# Patient Record
Sex: MALE | Race: WHITE | Employment: FULL TIME | URBAN - METROPOLITAN AREA
[De-identification: names, ages, dates, MRNs, and addresses within clinical notes are randomized per-mention and may not be internally consistent; named-entity substitution may affect disease eponyms.]

---

## 2017-06-22 ENCOUNTER — OFFICE VISIT (OUTPATIENT)
Dept: FAMILY MEDICINE CLINIC | Facility: CLINIC | Age: 27
End: 2017-06-22

## 2017-06-22 VITALS
RESPIRATION RATE: 18 BRPM | WEIGHT: 165 LBS | SYSTOLIC BLOOD PRESSURE: 140 MMHG | TEMPERATURE: 98 F | BODY MASS INDEX: 25.59 KG/M2 | HEIGHT: 67.5 IN | DIASTOLIC BLOOD PRESSURE: 72 MMHG | HEART RATE: 73 BPM | OXYGEN SATURATION: 98 %

## 2017-06-22 DIAGNOSIS — S76.211A GROIN STRAIN, RIGHT, INITIAL ENCOUNTER: ICD-10-CM

## 2017-06-22 DIAGNOSIS — Z13.6 SCREENING FOR HYPERTENSION: Primary | ICD-10-CM

## 2017-06-22 DIAGNOSIS — Z11.59 NEED FOR HEPATITIS C SCREENING TEST: ICD-10-CM

## 2017-06-22 DIAGNOSIS — F10.10 ALCOHOL ABUSE: ICD-10-CM

## 2017-06-22 DIAGNOSIS — R52 PAIN: ICD-10-CM

## 2017-06-22 DIAGNOSIS — Z71.6 TOBACCO ABUSE COUNSELING: ICD-10-CM

## 2017-06-22 PROCEDURE — 99213 OFFICE O/P EST LOW 20 MIN: CPT | Performed by: FAMILY MEDICINE

## 2017-06-22 NOTE — PROGRESS NOTES
An extremely anxious gentleman who is concerned about a pain in the right mons pubis adjacent to an area of hernia repair as a toddler. At that time the repair was uneventful. He has decreased his smoking but is still drinking 6-8 beers per day.   He ad

## 2017-08-02 PROCEDURE — 87081 CULTURE SCREEN ONLY: CPT | Performed by: EMERGENCY MEDICINE

## 2017-08-02 PROCEDURE — 87147 CULTURE TYPE IMMUNOLOGIC: CPT | Performed by: EMERGENCY MEDICINE

## 2018-01-29 ENCOUNTER — TELEPHONE (OUTPATIENT)
Dept: FAMILY MEDICINE CLINIC | Facility: CLINIC | Age: 28
End: 2018-01-29

## 2018-01-29 NOTE — TELEPHONE ENCOUNTER
Patient having chest tightness on and off x 2 weeks, also some sob. Patient sent to  for eval. Agreed to go.

## 2018-07-28 ENCOUNTER — HOSPITAL ENCOUNTER (OUTPATIENT)
Age: 28
Discharge: HOME | End: 2018-07-28
Payer: COMMERCIAL

## 2018-07-28 VITALS
DIASTOLIC BLOOD PRESSURE: 89 MMHG | BODY MASS INDEX: 25.17 KG/M2 | TEMPERATURE: 99 F | WEIGHT: 168 LBS | HEIGHT: 68.5 IN | RESPIRATION RATE: 16 BRPM | SYSTOLIC BLOOD PRESSURE: 150 MMHG | OXYGEN SATURATION: 98 % | HEART RATE: 71 BPM

## 2018-07-28 DIAGNOSIS — R10.13 EPIGASTRIC BURNING SENSATION: Primary | ICD-10-CM

## 2018-07-28 LAB
#LYMPHOCYTE IC: 2.2 X10ˆ3/UL (ref 0.9–3.2)
#MXD IC: 0.6 X10ˆ3/UL (ref 0.1–1)
#NEUTROPHIL IC: 6.1 X10ˆ3/UL (ref 1.3–6.7)
CREAT SERPL-MCNC: 1 MG/DL (ref 0.7–1.3)
GLUCOSE BLD-MCNC: 104 MG/DL (ref 70–99)
HCT IC: 46.8 % (ref 37–54)
HGB IC: 15.9 G/DL (ref 13–17)
ISTAT BLOOD GAS TCO2: 28 MMOL/L (ref 22–32)
ISTAT BUN: 18 MG/DL (ref 8–20)
ISTAT CHLORIDE: 101 MMOL/L (ref 101–111)
ISTAT HEMATOCRIT: 49 % (ref 37–53)
ISTAT IONIZED CALCIUM: 1.06 MMOL/L
ISTAT POTASSIUM: 3.6 MMOL/L (ref 3.6–5.1)
ISTAT SODIUM: 142 MMOL/L (ref 136–144)
ISTAT TROPONIN: <0.1 NG/ML (ref ?–0.1)
LYMPHOCYTES NFR BLD AUTO: 24.2 %
MCH IC: 30.9 PG (ref 27–33.2)
MCHC IC: 34 G/DL (ref 31–37)
MCV IC: 91.1 FL (ref 80–99)
MIXED CELL %: 7.2 %
NEUTROPHILS NFR BLD AUTO: 68.6 %
PLT IC: 256 X10ˆ3/UL (ref 150–450)
RBC IC: 5.14 X10ˆ6/UL (ref 4.3–5.7)
WBC IC: 8.9 X10ˆ3/UL (ref 4–13)

## 2018-07-28 PROCEDURE — 36415 COLL VENOUS BLD VENIPUNCTURE: CPT

## 2018-07-28 PROCEDURE — 99213 OFFICE O/P EST LOW 20 MIN: CPT

## 2018-07-28 PROCEDURE — 84484 ASSAY OF TROPONIN QUANT: CPT

## 2018-07-28 PROCEDURE — 80047 BASIC METABLC PNL IONIZED CA: CPT

## 2018-07-28 PROCEDURE — 80053 COMPREHEN METABOLIC PANEL: CPT | Performed by: NURSE PRACTITIONER

## 2018-07-28 PROCEDURE — 83690 ASSAY OF LIPASE: CPT | Performed by: NURSE PRACTITIONER

## 2018-07-28 PROCEDURE — 85025 COMPLETE CBC W/AUTO DIFF WBC: CPT | Performed by: NURSE PRACTITIONER

## 2018-07-28 RX ORDER — OMEPRAZOLE 40 MG/1
40 CAPSULE, DELAYED RELEASE ORAL DAILY
Qty: 14 CAPSULE | Refills: 0 | Status: SHIPPED | OUTPATIENT
Start: 2018-07-28 | End: 2018-08-11

## 2018-07-28 RX ORDER — ONDANSETRON 4 MG/1
4 TABLET, ORALLY DISINTEGRATING ORAL EVERY 4 HOURS PRN
Qty: 10 TABLET | Refills: 0 | Status: SHIPPED | OUTPATIENT
Start: 2018-07-28 | End: 2018-08-04

## 2018-07-28 NOTE — ED INITIAL ASSESSMENT (HPI)
Pt sts 5 days ago began with intermittent nausea, lower abd pain-burning- worse after eating, intermittent diarrhea, \"extreme fatigue. \"  Cough for past 3 days.

## 2018-07-28 NOTE — ED PROVIDER NOTES
Patient Seen in: [address] [facility]    History   Patient presents with:  Nausea/Vomiting/Diarrhea (gastrointestinal)  Abdomen/Flank Pain (GI/)    Stated Complaint: nauseated X 5 days; stomach burning; extreme fatigue    HPI  Patient is a 2 EXCISION OF SCROTAL CYST;  Surgeon:               Mahsa Mix MD;  Location: [address] [place] [address],[address]  1/24/2013: [address] <5MM REMAINDR BODY      Comment: Procedure: EXCISION OF SCROTAL CYST;  Surgeon:               Chema Nelson CYST;  Surgeon:               Zuleika Jimenez MD;  Location: [address] [place] [address],[address]  1/24/2013: [address] <5MM REMAINDR BODY      Comment: Procedure: EXCISION OF SCROTAL CYST;  Surgeon:               Zuleika Jimenez MD;  Location:  Azael Farah MD;  Location: [address] [place] [address],[address]  1/24/2013: [address] <5MM REMAINDR BODY      Comment: Procedure: EXCISION OF SCROTAL CYST;  Surgeon:               Azael Farah MD;  Location: [facility] Mouth/Throat: Uvula is midline, oropharynx is clear and moist and mucous membranes are normal.   Eyes: Conjunctivae are normal.   Cardiovascular: Normal rate, regular rhythm, normal heart sounds and intact distal pulses.   Exam reveals no gallop and no fr the patient that he will need to go to the emergency department if his symptoms persist or worsen. Will discharge home with omeprazole 40 mg daily and Zofran as needed. Advised patient to avoid alcohol and tobacco and to eat a bland diet .   patient has a

## 2018-07-28 NOTE — ED PROVIDER NOTES
I reviewed that chart and discussed the case.  epigastric pain, nausea. No CP or SOB. Pt refuses imaging and EKG. Agreed to lab work. Unremarkable. Trop negative. CMP and lipase pending. Pt requesting zofran. Understands risk of missed MI.   S/s mo

## 2018-07-29 LAB
ALBUMIN SERPL-MCNC: 5.1 G/DL (ref 3.5–4.8)
ALBUMIN/GLOB SERPL: 1.3 {RATIO} (ref 1–2)
ALP LIVER SERPL-CCNC: 68 U/L (ref 45–117)
ALT SERPL-CCNC: 62 U/L (ref 17–63)
ANION GAP SERPL CALC-SCNC: 7 MMOL/L (ref 0–18)
AST SERPL-CCNC: 42 U/L (ref 15–41)
BILIRUB SERPL-MCNC: 0.4 MG/DL (ref 0.1–2)
BUN BLD-MCNC: 17 MG/DL (ref 8–20)
BUN/CREAT SERPL: 15.5 (ref 10–20)
CALCIUM BLD-MCNC: 9.5 MG/DL (ref 8.3–10.3)
CHLORIDE SERPL-SCNC: 105 MMOL/L (ref 101–111)
CO2 SERPL-SCNC: 27 MMOL/L (ref 22–32)
CREAT BLD-MCNC: 1.1 MG/DL (ref 0.7–1.3)
GLOBULIN PLAS-MCNC: 4 G/DL (ref 2.5–3.7)
GLUCOSE BLD-MCNC: 92 MG/DL (ref 70–99)
LIPASE: 146 U/L (ref 73–393)
M PROTEIN MFR SERPL ELPH: 9.1 G/DL (ref 6.1–8.3)
OSMOLALITY SERPL CALC.SUM OF ELEC: 289 MOSM/KG (ref 275–295)
POTASSIUM SERPL-SCNC: 5.3 MMOL/L (ref 3.6–5.1)
SODIUM SERPL-SCNC: 139 MMOL/L (ref 136–144)

## 2018-07-30 NOTE — ED NOTES
Patient calling for lab results. Patient informed that K was elevated. Patient states he has an appt with is pcp on Thursday for follow up. Pt advised to call them today if he won't go to ER to see if pcp would advise to have lab re-drawn prior to appt.  Pa

## 2018-08-02 ENCOUNTER — OFFICE VISIT (OUTPATIENT)
Dept: FAMILY MEDICINE CLINIC | Facility: CLINIC | Age: 28
End: 2018-08-02
Payer: COMMERCIAL

## 2018-08-02 VITALS
WEIGHT: 170 LBS | BODY MASS INDEX: 26.37 KG/M2 | HEIGHT: 67.5 IN | TEMPERATURE: 99 F | DIASTOLIC BLOOD PRESSURE: 84 MMHG | RESPIRATION RATE: 16 BRPM | SYSTOLIC BLOOD PRESSURE: 136 MMHG | OXYGEN SATURATION: 98 % | HEART RATE: 66 BPM

## 2018-08-02 DIAGNOSIS — R10.13 EPIGASTRIC PAIN: ICD-10-CM

## 2018-08-02 DIAGNOSIS — Z13.1 SCREENING FOR DIABETES MELLITUS: ICD-10-CM

## 2018-08-02 DIAGNOSIS — Z13.220 ENCOUNTER FOR LIPID SCREENING FOR CARDIOVASCULAR DISEASE: ICD-10-CM

## 2018-08-02 DIAGNOSIS — Z13.29 SCREENING FOR THYROID DISORDER: ICD-10-CM

## 2018-08-02 DIAGNOSIS — Z13.0 SCREENING, ANEMIA, DEFICIENCY, IRON: ICD-10-CM

## 2018-08-02 DIAGNOSIS — Z13.6 ENCOUNTER FOR LIPID SCREENING FOR CARDIOVASCULAR DISEASE: ICD-10-CM

## 2018-08-02 DIAGNOSIS — R63.4 UNEXPLAINED WEIGHT LOSS: Primary | ICD-10-CM

## 2018-08-02 PROCEDURE — 99214 OFFICE O/P EST MOD 30 MIN: CPT | Performed by: FAMILY MEDICINE

## 2018-08-02 RX ORDER — DICYCLOMINE HYDROCHLORIDE 10 MG/1
10 CAPSULE ORAL 4 TIMES DAILY
Qty: 40 CAPSULE | Refills: 3 | Status: SHIPPED | OUTPATIENT
Start: 2018-08-02 | End: 2018-08-12

## 2018-08-02 RX ORDER — DICYCLOMINE HYDROCHLORIDE 10 MG/1
10 CAPSULE ORAL 4 TIMES DAILY
Qty: 40 CAPSULE | Refills: 3 | Status: SHIPPED | OUTPATIENT
Start: 2018-08-02 | End: 2018-08-02

## 2018-08-02 NOTE — PROGRESS NOTES
No usually anxious young man here with numerous complaints has been having postprandial midepigastric discomfort for the past 1-2 months.   Occasionally has upper symmetric chest discomfort and has been evaluated in urgent care with negative cardiac enzymes

## 2019-07-06 ENCOUNTER — HOSPITAL ENCOUNTER (OUTPATIENT)
Age: 29
Discharge: HOME | End: 2019-07-06
Attending: FAMILY MEDICINE
Payer: COMMERCIAL

## 2019-07-06 ENCOUNTER — APPOINTMENT (OUTPATIENT)
Dept: GENERAL RADIOLOGY | Age: 29
End: 2019-07-06
Attending: FAMILY MEDICINE
Payer: COMMERCIAL

## 2019-07-06 VITALS
TEMPERATURE: 99 F | RESPIRATION RATE: 16 BRPM | OXYGEN SATURATION: 97 % | SYSTOLIC BLOOD PRESSURE: 145 MMHG | DIASTOLIC BLOOD PRESSURE: 82 MMHG | HEART RATE: 76 BPM

## 2019-07-06 DIAGNOSIS — J20.9 ACUTE BRONCHITIS, UNSPECIFIED ORGANISM: Primary | ICD-10-CM

## 2019-07-06 PROCEDURE — 71046 X-RAY EXAM CHEST 2 VIEWS: CPT | Performed by: FAMILY MEDICINE

## 2019-07-06 PROCEDURE — 99214 OFFICE O/P EST MOD 30 MIN: CPT

## 2019-07-06 PROCEDURE — 99213 OFFICE O/P EST LOW 20 MIN: CPT

## 2019-07-06 RX ORDER — BENZONATATE 200 MG/1
200 CAPSULE ORAL 3 TIMES DAILY PRN
Qty: 15 CAPSULE | Refills: 0 | Status: SHIPPED | OUTPATIENT
Start: 2019-07-06 | End: 2020-05-01 | Stop reason: ALTCHOICE

## 2019-07-06 RX ORDER — AMOXICILLIN AND CLAVULANATE POTASSIUM 875; 125 MG/1; MG/1
1 TABLET, FILM COATED ORAL 2 TIMES DAILY
Qty: 14 TABLET | Refills: 0 | Status: SHIPPED | OUTPATIENT
Start: 2019-07-06 | End: 2019-07-13

## 2019-07-06 RX ORDER — ALBUTEROL SULFATE 90 UG/1
2 AEROSOL, METERED RESPIRATORY (INHALATION) EVERY 4 HOURS PRN
Qty: 1 INHALER | Refills: 6 | Status: SHIPPED | OUTPATIENT
Start: 2019-07-06 | End: 2019-07-16

## 2019-07-06 RX ORDER — PREDNISONE 20 MG/1
40 TABLET ORAL DAILY
Qty: 10 TABLET | Refills: 0 | Status: SHIPPED | OUTPATIENT
Start: 2019-07-06 | End: 2019-07-11

## 2019-07-06 NOTE — ED INITIAL ASSESSMENT (HPI)
Pt sts cough for 2-3 days. Today noted blood in sputum. Feeling tight and congested in chest. No known fever.

## 2019-07-06 NOTE — ED PROVIDER NOTES
Patient Seen in: [address] [facility]    History   Patient presents with:  Cough/URI    Stated Complaint: cough x 3 days    HPI    25-year-old male presents to the immediate care today with chief complaints of  chest congestion, productive cou Normocephalic, without obvious abnormality, atraumatic  Eyes: conjunctivae/corneas clear. PERRL, EOM's intact. Fundi benign. Ears: normal TM's and external ear canals both ears  Nose: Nares normal. Septum midline.  Mucosa normal. No drainage or sinus tende [id number]  [phone]    In 1 week  For re-check        Medications Prescribed:  Current Discharge Medication List    START taking these medications    Amoxicillin-Pot Clavulanate 875-125 MG Oral Tab  Take 1 tablet by mouth 2 (two) times daily for 7 days.   Q

## 2019-08-14 ENCOUNTER — HOSPITAL ENCOUNTER (OUTPATIENT)
Age: 29
Discharge: HOME | End: 2019-08-14
Attending: FAMILY MEDICINE
Payer: COMMERCIAL

## 2019-08-14 VITALS
HEART RATE: 84 BPM | DIASTOLIC BLOOD PRESSURE: 87 MMHG | SYSTOLIC BLOOD PRESSURE: 151 MMHG | RESPIRATION RATE: 16 BRPM | TEMPERATURE: 98 F | BODY MASS INDEX: 26.52 KG/M2 | OXYGEN SATURATION: 97 % | WEIGHT: 175 LBS | HEIGHT: 68 IN

## 2019-08-14 DIAGNOSIS — R51.9 ACUTE NONINTRACTABLE HEADACHE, UNSPECIFIED HEADACHE TYPE: Primary | ICD-10-CM

## 2019-08-14 PROCEDURE — 99212 OFFICE O/P EST SF 10 MIN: CPT

## 2019-08-14 RX ORDER — KETOROLAC TROMETHAMINE 30 MG/ML
60 INJECTION, SOLUTION INTRAMUSCULAR; INTRAVENOUS ONCE
Status: DISCONTINUED | OUTPATIENT
Start: 2019-08-14 | End: 2019-08-14

## 2019-08-14 NOTE — ED PROVIDER NOTES
Patient Seen in: [address] [facility]    History   Patient presents with:  Headache (neurologic)  Back Pain (musculoskeletal)  Numbness Weakness (neurologic)    Stated Complaint: headache, feels \"spacey\"    HPI    **10-year-old male presents for stated complaint: headache, feels \"spacey\"  Other systems are as noted in HPI. Constitutional and vital signs reviewed. All other systems reviewed and negative except as noted above.     Physical Exam     ED Triage Vitals [08/14/19 1738]   BP 15 sensation in both legs and numbness in his left leg, back pain, abdominal pain. Multiple etiologies were discussed in detail. No specific etiology could be identified.   With regards to his neuro symptoms I decided to do a CT brain to rule out any acute i

## 2019-08-14 NOTE — ED INITIAL ASSESSMENT (HPI)
Headache started this morning. Back pain 4-5 days ago but is feeling better, no injury. Today at 1pm while at work, had heaviness in both legs and numbness to left leg. Patient states felt like legs were heavy \"like he was walking with cement shoes\".  Hea

## 2019-10-08 ENCOUNTER — OFFICE VISIT (OUTPATIENT)
Dept: FAMILY MEDICINE CLINIC | Facility: CLINIC | Age: 29
End: 2019-10-08
Payer: COMMERCIAL

## 2019-10-08 VITALS
HEIGHT: 68 IN | BODY MASS INDEX: 27.13 KG/M2 | RESPIRATION RATE: 16 BRPM | DIASTOLIC BLOOD PRESSURE: 82 MMHG | SYSTOLIC BLOOD PRESSURE: 136 MMHG | TEMPERATURE: 98 F | HEART RATE: 72 BPM | OXYGEN SATURATION: 98 % | WEIGHT: 179 LBS

## 2019-10-08 DIAGNOSIS — N50.9 LESION OF SCROTUM: Primary | ICD-10-CM

## 2019-10-08 PROBLEM — L72.3 SEBACEOUS CYST OF SCROTUM: Status: ACTIVE | Noted: 2019-10-08

## 2019-10-08 PROCEDURE — 99212 OFFICE O/P EST SF 10 MIN: CPT | Performed by: FAMILY MEDICINE

## 2019-10-08 NOTE — PROGRESS NOTES
Patient is here for examination of the lesion on his scrotal sac right side upper pole he has had these removed under anesthesia apparently in years past but not at this office.   On today's exam a 1/2 cm lesion raised yellow-white in appearance nondraining

## 2019-11-15 ENCOUNTER — OFFICE VISIT (OUTPATIENT)
Dept: FAMILY MEDICINE CLINIC | Facility: CLINIC | Age: 29
End: 2019-11-15
Payer: COMMERCIAL

## 2019-11-15 VITALS
RESPIRATION RATE: 16 BRPM | SYSTOLIC BLOOD PRESSURE: 146 MMHG | HEART RATE: 88 BPM | OXYGEN SATURATION: 98 % | DIASTOLIC BLOOD PRESSURE: 98 MMHG | TEMPERATURE: 99 F

## 2019-11-15 DIAGNOSIS — L72.3 SEBACEOUS CYST OF SCROTUM: Primary | ICD-10-CM

## 2019-11-15 PROCEDURE — 10060 I&D ABSCESS SIMPLE/SINGLE: CPT | Performed by: FAMILY MEDICINE

## 2019-11-15 NOTE — PROGRESS NOTES
Here for elective removal of a 5 mm yellow-white elevated nonvascular lesion just in the right hemiscrotum we attempted removal in the past but he felt uncomfortable at that time. He presents today more willing for removal exam as noted.   Diagnosis is gra

## 2020-10-19 ENCOUNTER — OFFICE VISIT (OUTPATIENT)
Dept: FAMILY MEDICINE CLINIC | Facility: CLINIC | Age: 30
End: 2020-10-19
Payer: COMMERCIAL

## 2020-10-19 VITALS
WEIGHT: 184 LBS | DIASTOLIC BLOOD PRESSURE: 84 MMHG | RESPIRATION RATE: 20 BRPM | HEART RATE: 97 BPM | BODY MASS INDEX: 27.89 KG/M2 | TEMPERATURE: 99 F | SYSTOLIC BLOOD PRESSURE: 124 MMHG | OXYGEN SATURATION: 99 % | HEIGHT: 68 IN

## 2020-10-19 DIAGNOSIS — R10.9 STOMACH PAIN: Primary | ICD-10-CM

## 2020-10-19 PROCEDURE — 87086 URINE CULTURE/COLONY COUNT: CPT | Performed by: INTERNAL MEDICINE

## 2020-10-19 PROCEDURE — 3079F DIAST BP 80-89 MM HG: CPT | Performed by: INTERNAL MEDICINE

## 2020-10-19 PROCEDURE — 87591 N.GONORRHOEAE DNA AMP PROB: CPT | Performed by: INTERNAL MEDICINE

## 2020-10-19 PROCEDURE — 3008F BODY MASS INDEX DOCD: CPT | Performed by: INTERNAL MEDICINE

## 2020-10-19 PROCEDURE — 3074F SYST BP LT 130 MM HG: CPT | Performed by: INTERNAL MEDICINE

## 2020-10-19 PROCEDURE — 81003 URINALYSIS AUTO W/O SCOPE: CPT | Performed by: INTERNAL MEDICINE

## 2020-10-19 PROCEDURE — 99214 OFFICE O/P EST MOD 30 MIN: CPT | Performed by: INTERNAL MEDICINE

## 2020-10-19 PROCEDURE — 87491 CHLMYD TRACH DNA AMP PROBE: CPT | Performed by: INTERNAL MEDICINE

## 2020-10-19 RX ORDER — SULFAMETHOXAZOLE AND TRIMETHOPRIM 800; 160 MG/1; MG/1
1 TABLET ORAL 2 TIMES DAILY
Qty: 20 TABLET | Refills: 0 | Status: SHIPPED | OUTPATIENT
Start: 2020-10-19 | End: 2020-10-29

## 2020-10-19 NOTE — PROGRESS NOTES
Jennifer Durbin is a 30year old male. HPI:   Pt here with report of lower abdominal pain and scrotal pain for 5 days.  Hurts more if he presses his lower abdomen, may have some nausea too.  + lower dull back pain started today  + headaches  No new partne masses or tenderness on exam, no signs of epididymitis on exam, no teste tenderness on exam, not retracted on exam; + cremaster reflex on exam bilaterally  EXT: no cyanosis, clubbing or edema    ASSESSMENT AND PLAN:   Stomach pain  (primary encounter diagn

## 2020-10-22 ENCOUNTER — TELEPHONE (OUTPATIENT)
Dept: FAMILY MEDICINE CLINIC | Facility: CLINIC | Age: 30
End: 2020-10-22

## 2020-10-22 DIAGNOSIS — N50.82 SCROTUM PAIN: Primary | ICD-10-CM

## 2020-10-22 DIAGNOSIS — R10.2 PELVIC PAIN: ICD-10-CM

## 2020-10-22 NOTE — TELEPHONE ENCOUNTER
Patient had seen Mg Kern on 10/19. States that it was discussed if he was still having pain that she would be able to place an order for an ultrasound. Patient had wanted to get it done tomorrow. He does have an appt w/ Dr. Cherylene League tomorrow.     Please advi

## 2020-10-22 NOTE — TELEPHONE ENCOUNTER
Deloris Link to patient. Did not start medication 1st day,  Has taken 3 doses. C/o discomfort. Did advise patient abx may take 48hrs to kick in. Please advise.

## 2020-10-24 ENCOUNTER — HOSPITAL ENCOUNTER (EMERGENCY)
Facility: HOSPITAL | Age: 30
Discharge: HOME | End: 2020-10-24
Attending: EMERGENCY MEDICINE
Payer: COMMERCIAL

## 2020-10-24 ENCOUNTER — APPOINTMENT (OUTPATIENT)
Dept: ULTRASOUND IMAGING | Facility: HOSPITAL | Age: 30
End: 2020-10-24
Attending: EMERGENCY MEDICINE
Payer: COMMERCIAL

## 2020-10-24 VITALS
OXYGEN SATURATION: 98 % | TEMPERATURE: 98 F | SYSTOLIC BLOOD PRESSURE: 157 MMHG | BODY MASS INDEX: 27.43 KG/M2 | WEIGHT: 181 LBS | RESPIRATION RATE: 17 BRPM | HEART RATE: 81 BPM | HEIGHT: 68 IN | DIASTOLIC BLOOD PRESSURE: 107 MMHG

## 2020-10-24 DIAGNOSIS — N50.811 PAIN IN RIGHT TESTICLE: Primary | ICD-10-CM

## 2020-10-24 PROCEDURE — 99284 EMERGENCY DEPT VISIT MOD MDM: CPT

## 2020-10-24 PROCEDURE — 76870 US EXAM SCROTUM: CPT | Performed by: EMERGENCY MEDICINE

## 2020-10-24 PROCEDURE — 93975 VASCULAR STUDY: CPT | Performed by: EMERGENCY MEDICINE

## 2020-10-24 NOTE — ED INITIAL ASSESSMENT (HPI)
Arrives with c/o right testicular pain x 3 days. Was seen at the doctor a couple of days ago, started on antibiotic but stopped after three days because was not improving. Was to undergo an U/S on Wednesday but was unable to wait.   States pain is worse w

## 2020-10-25 NOTE — ED PROVIDER NOTES
Patient Seen in: [facility] Emergency Department      History   Patient presents with:  Eval-SHORTY    Stated Complaint: testicle pain, denies injury     HPI    Patient is a pleasant 40-year-old male, presenting for evaluation of aching discomfort in his Physical Exam  Vitals signs and nursing note reviewed. Constitutional:       General: He is not in acute distress. Appearance: He is not ill-appearing or toxic-appearing. HENT:      Head: Normocephalic and atraumatic.       Right Ear: Extern HYDROCELE:  Small left hydrocele. VARICOCELE:  Negative OTHER:  There is a 7 x 3 mm calcification within the left tunica albuginea consistent with a scrotal conner. CONCLUSION:  1. No evidence of torsion. Normal appearing testicles.   Small left

## 2020-11-16 ENCOUNTER — HOSPITAL ENCOUNTER (OUTPATIENT)
Age: 30
Discharge: ANOTHER HOSPITAL | End: 2020-11-16
Payer: COMMERCIAL

## 2020-11-16 VITALS
TEMPERATURE: 98 F | DIASTOLIC BLOOD PRESSURE: 80 MMHG | OXYGEN SATURATION: 100 % | HEART RATE: 76 BPM | SYSTOLIC BLOOD PRESSURE: 141 MMHG | RESPIRATION RATE: 16 BRPM

## 2020-11-16 DIAGNOSIS — R06.02 SHORTNESS OF BREATH: Primary | ICD-10-CM

## 2020-11-16 DIAGNOSIS — M54.9 MID BACK PAIN: ICD-10-CM

## 2020-11-16 DIAGNOSIS — R68.89 EXERCISE INTOLERANCE: ICD-10-CM

## 2020-11-16 PROCEDURE — 99214 OFFICE O/P EST MOD 30 MIN: CPT | Performed by: PHYSICIAN ASSISTANT

## 2020-11-16 PROCEDURE — 93000 ELECTROCARDIOGRAM COMPLETE: CPT | Performed by: PHYSICIAN ASSISTANT

## 2020-11-16 NOTE — ED INITIAL ASSESSMENT (HPI)
Patient has been having intermittent SOB -especially with exertion. He feels like he can't always get a deep breath. He is normally a runner and has felt like he can't run like normal due to his breathing.  He also woke up last night to go to the bathroom w

## 2021-02-08 ENCOUNTER — OFFICE VISIT (OUTPATIENT)
Dept: FAMILY MEDICINE CLINIC | Facility: CLINIC | Age: 31
End: 2021-02-08
Payer: COMMERCIAL

## 2021-02-08 VITALS
TEMPERATURE: 98 F | BODY MASS INDEX: 27.28 KG/M2 | DIASTOLIC BLOOD PRESSURE: 76 MMHG | HEIGHT: 68 IN | SYSTOLIC BLOOD PRESSURE: 140 MMHG | HEART RATE: 73 BPM | WEIGHT: 180 LBS | OXYGEN SATURATION: 98 % | RESPIRATION RATE: 20 BRPM

## 2021-02-08 DIAGNOSIS — Z00.00 ROUTINE GENERAL MEDICAL EXAMINATION AT A HEALTH CARE FACILITY: ICD-10-CM

## 2021-02-08 DIAGNOSIS — I10 ESSENTIAL HYPERTENSION: Primary | ICD-10-CM

## 2021-02-08 PROCEDURE — 99214 OFFICE O/P EST MOD 30 MIN: CPT | Performed by: INTERNAL MEDICINE

## 2021-02-08 PROCEDURE — 3077F SYST BP >= 140 MM HG: CPT | Performed by: INTERNAL MEDICINE

## 2021-02-08 PROCEDURE — 3008F BODY MASS INDEX DOCD: CPT | Performed by: INTERNAL MEDICINE

## 2021-02-08 PROCEDURE — 3078F DIAST BP <80 MM HG: CPT | Performed by: INTERNAL MEDICINE

## 2021-02-08 RX ORDER — LISINOPRIL 5 MG/1
5 TABLET ORAL DAILY
Qty: 30 TABLET | Refills: 0 | Status: SHIPPED | OUTPATIENT
Start: 2021-02-08

## 2021-02-08 NOTE — PATIENT INSTRUCTIONS
Please fast at least 8 hours for labs. Water, black coffee, or plain tea only. Any sugar in the system will alter the glucose level and triglyceride level.   You may take your medication in the morning as usual.         High Blood Pressure, New, Begin Martinez program if you are overweight. · Cut back on how much salt you get in your diet. Here’s how to do this:  ? Don’t eat foods that have a lot of salt. These include olives, pickles, smoked meats, and salted potato chips.   ? Don’t add salt to your food at the get out of control. · If you miss a dose or doses, check with your healthcare provider or pharmacist about what to do. · Limit alcohol. Drinking too much alcohol can raise blood pressure. Men should have no more than 2 drinks a day.  Women should have no higher, seek emergency medical treatment. Follow-up care  Because a new blood pressure medicine was started today, it’s important that you have your blood pressure rechecked.  This is to make sure that the medicine is working and that you have no serious s

## 2021-02-08 NOTE — PROGRESS NOTES
HPI:   Rajwinder Tovar presents for check up of his blood pressure. No previous hx but has been checking his pressures at home after not feeling well one day. BPs 150s/80s-90s at home. Hx of anxiety but states it has been consistently elevated at home.   Father kg)   SpO2 98%   BMI 27.37 kg/m²        Body mass index is 27.37 kg/m².     GENERAL: well developed, well nourished,in no apparent distress  SKIN: no rashes,no suspicious lesions  HEENT: atraumatic, normocephalic,TMs clear, throat clear  NECK: supple,no inessa

## 2022-03-16 ENCOUNTER — TELEPHONE (OUTPATIENT)
Dept: FAMILY MEDICINE CLINIC | Facility: CLINIC | Age: 32
End: 2022-03-16

## 2022-09-30 ENCOUNTER — TELEPHONE (OUTPATIENT)
Dept: FAMILY MEDICINE CLINIC | Facility: CLINIC | Age: 32
End: 2022-09-30

## 2022-09-30 NOTE — TELEPHONE ENCOUNTER
Spoke with pt to get an updated on his BP  Pt will sent a Yandext msg with BP reading from today. Otherwise per pt, he is been doing really good. Pt verbalized understanding.

## 2023-03-13 ENCOUNTER — HOSPITAL ENCOUNTER (OUTPATIENT)
Age: 33
Discharge: HOME | End: 2023-03-13

## 2023-03-13 VITALS
OXYGEN SATURATION: 99 % | HEIGHT: 68 IN | RESPIRATION RATE: 18 BRPM | HEART RATE: 78 BPM | WEIGHT: 180 LBS | TEMPERATURE: 98 F | BODY MASS INDEX: 27.28 KG/M2 | SYSTOLIC BLOOD PRESSURE: 152 MMHG | DIASTOLIC BLOOD PRESSURE: 93 MMHG

## 2023-03-13 DIAGNOSIS — J02.9 SORE THROAT: ICD-10-CM

## 2023-03-13 DIAGNOSIS — I10 ELEVATED BLOOD PRESSURE READING IN OFFICE WITH DIAGNOSIS OF HYPERTENSION: Primary | ICD-10-CM

## 2023-03-13 DIAGNOSIS — J02.9 ACUTE VIRAL PHARYNGITIS: ICD-10-CM

## 2023-03-13 LAB — S PYO AG THROAT QL: NEGATIVE

## 2023-03-13 PROCEDURE — 87880 STREP A ASSAY W/OPTIC: CPT | Performed by: PHYSICIAN ASSISTANT

## 2023-03-13 PROCEDURE — 87081 CULTURE SCREEN ONLY: CPT | Performed by: PHYSICIAN ASSISTANT

## 2023-03-13 PROCEDURE — 87147 CULTURE TYPE IMMUNOLOGIC: CPT | Performed by: PHYSICIAN ASSISTANT

## 2023-03-13 PROCEDURE — 99213 OFFICE O/P EST LOW 20 MIN: CPT | Performed by: PHYSICIAN ASSISTANT

## 2023-03-13 NOTE — DISCHARGE INSTRUCTIONS
Monitor your blood pressure at home once a day follow-up with PCP for full history and physical in 1 week     Warm salt water gargles, tylenol and ibuprofen as needed, if worsening symptoms or difficulty swallowing be re-evaluated  Tylenol ibuprofen discharge    Your strep culture is pending. It will be resulted in the next 48 hours. If you had a negative rapid strep test today, and your culture is positive for strep, we will call to notify you and electronically send a prescription to your pharmacy for the proper antibiotics to treat strep throat.

## 2023-03-13 NOTE — ED INITIAL ASSESSMENT (HPI)
Pt c/o fever last Wednesday and Thursday. Pt states sore throat for the last 2 days.   Tried otc spray without relief

## 2023-03-16 RX ORDER — ALBUTEROL SULFATE 90 UG/1
2 AEROSOL, METERED RESPIRATORY (INHALATION) EVERY 4 HOURS PRN
Qty: 1 EACH | Refills: 0 | Status: SHIPPED | OUTPATIENT
Start: 2023-03-16 | End: 2023-04-15

## 2023-03-16 RX ORDER — AMOXICILLIN 500 MG/1
500 TABLET, FILM COATED ORAL 2 TIMES DAILY
Qty: 20 TABLET | Refills: 0 | Status: SHIPPED | OUTPATIENT
Start: 2023-03-16 | End: 2023-03-26

## 2023-05-19 ENCOUNTER — APPOINTMENT (OUTPATIENT)
Dept: URBAN - METROPOLITAN AREA CLINIC 249 | Age: 33
Setting detail: DERMATOLOGY
End: 2023-05-23

## 2023-05-19 DIAGNOSIS — D49.2 NEOPLASM OF UNSPECIFIED BEHAVIOR OF BONE, SOFT TISSUE, AND SKIN: ICD-10-CM

## 2023-05-19 PROCEDURE — OTHER BIOPSY BY SHAVE METHOD: OTHER

## 2023-05-19 PROCEDURE — A4550 SURGICAL TRAYS: HCPCS

## 2023-05-19 PROCEDURE — 11102 TANGNTL BX SKIN SINGLE LES: CPT

## 2023-05-19 PROCEDURE — OTHER COUNSELING: OTHER

## 2023-05-19 ASSESSMENT — LOCATION DETAILED DESCRIPTION DERM: LOCATION DETAILED: SUPRAPUBIC SKIN

## 2023-05-19 ASSESSMENT — LOCATION ZONE DERM: LOCATION ZONE: TRUNK

## 2023-05-19 ASSESSMENT — LOCATION SIMPLE DESCRIPTION DERM: LOCATION SIMPLE: GROIN

## 2023-05-19 NOTE — PROCEDURE: BIOPSY BY SHAVE METHOD
Information: Selecting Yes will display possible errors in your note based on the variables you have selected. This validation is only offered as a suggestion for you. PLEASE NOTE THAT THE VALIDATION TEXT WILL BE REMOVED WHEN YOU FINALIZE YOUR NOTE. IF YOU WANT TO FAX A PRELIMINARY NOTE YOU WILL NEED TO TOGGLE THIS TO 'NO' IF YOU DO NOT WANT IT IN YOUR FAXED NOTE.
Biopsy Method: Dermablade
Render In Bullet Format When Appropriate: No
Curettage Text: The wound bed was treated with curettage after the biopsy was performed.
Detail Level: Detailed
Electrodesiccation And Curettage Text: The wound bed was treated with electrodesiccation and curettage after the biopsy was performed.
Billing Type: Third-Party Bill
Anesthesia Type: 1% lidocaine with epinephrine
Depth Of Biopsy: dermis
Bill For Surgical Tray: yes
Consent: Written consent was obtained and risks were reviewed including but not limited to scarring, infection, bleeding, scabbing, incomplete removal, nerve damage and allergy to anesthesia.
Notification Instructions: Patient will be notified of biopsy results. However, patient instructed to call the office if not contacted within 2 weeks.
Electrodesiccation Text: The wound bed was treated with electrodesiccation after the biopsy was performed.
Cryotherapy Text: The wound bed was treated with cryotherapy after the biopsy was performed.
Biopsy Type: H and E
X Size Of Lesion In Cm: 0
Post-Care Instructions: I reviewed with the patient in detail post-care instructions. Patient is to keep the biopsy site dry overnight, and then apply bacitracin twice daily until healed. Patient may apply hydrogen peroxide soaks to remove any crusting.
Anesthesia Volume In Cc (Will Not Render If 0): 0.5
Hemostasis: Drysol
Type Of Destruction Used: Curettage
Size Of Lesion In Cm: 3
Wound Care: Petrolatum
Dressing: bandage
Silver Nitrate Text: The wound bed was treated with silver nitrate after the biopsy was performed.

## 2023-06-09 ENCOUNTER — PATIENT OUTREACH (OUTPATIENT)
Dept: FAMILY MEDICINE CLINIC | Facility: CLINIC | Age: 33
End: 2023-06-09

## 2023-06-09 NOTE — PROGRESS NOTES
Pt states he will call back on Monday when he has his schedule in front of him to schedule annual px

## 2023-09-13 ENCOUNTER — TELEPHONE (OUTPATIENT)
Dept: FAMILY MEDICINE CLINIC | Facility: CLINIC | Age: 33
End: 2023-09-13

## 2023-10-03 NOTE — ED NOTES
Patient calling to clarify lab results he was given earlier today. Patient asking why K result was normal when tested while he was here as opposed to the lab send out that showed an elevated result.  Patient advised to contact pcp to inquire about having th Peng Advancement Flap Text: The defect edges were debeveled with a #15 scalpel blade.  Given the location of the defect, shape of the defect and the proximity to free margins a Peng advancement flap was deemed most appropriate.  Using a sterile surgical marker, an appropriate advancement flap was drawn incorporating the defect and placing the expected incisions within the relaxed skin tension lines where possible. The area thus outlined was incised deep to adipose tissue with a #15 scalpel blade.  The skin margins were undermined to an appropriate distance in all directions utilizing iris scissors.

## 2025-04-30 ENCOUNTER — OFFICE VISIT (OUTPATIENT)
Dept: FAMILY MEDICINE CLINIC | Facility: CLINIC | Age: 35
End: 2025-04-30
Payer: COMMERCIAL

## 2025-04-30 VITALS
HEART RATE: 80 BPM | WEIGHT: 180 LBS | TEMPERATURE: 99 F | SYSTOLIC BLOOD PRESSURE: 138 MMHG | BODY MASS INDEX: 27.92 KG/M2 | RESPIRATION RATE: 16 BRPM | HEIGHT: 67.5 IN | DIASTOLIC BLOOD PRESSURE: 86 MMHG | OXYGEN SATURATION: 98 %

## 2025-04-30 DIAGNOSIS — R10.31 RIGHT INGUINAL PAIN: Primary | ICD-10-CM

## 2025-04-30 PROCEDURE — 3079F DIAST BP 80-89 MM HG: CPT | Performed by: FAMILY MEDICINE

## 2025-04-30 PROCEDURE — 99213 OFFICE O/P EST LOW 20 MIN: CPT | Performed by: FAMILY MEDICINE

## 2025-04-30 PROCEDURE — 3075F SYST BP GE 130 - 139MM HG: CPT | Performed by: FAMILY MEDICINE

## 2025-04-30 PROCEDURE — 3008F BODY MASS INDEX DOCD: CPT | Performed by: FAMILY MEDICINE

## 2025-04-30 NOTE — PROGRESS NOTES
CHIEF COMPLAINT:     Chief Complaint   Patient presents with    Groin Pain     3 days, started Saturday, Right side groin pain, and lower stomach pain  OTC none         HPI:   Nilesh Alvarado is a 34 year old male who presents for complaints of right groin pain that started 4 days ago. Pain began after exercise and seems to be worse with flexion of the abd. Pt has been avoiding pressing exercises since injury developed, but has been continuing with pulling exercises and notes no pain with the movements.   Has noted a small, tender lump in the right groin and is concerned for hernia.   No hx hernia in the past.  Pt denies pain at rest and is able to work a full shift including lifting and getting in and out of car without exacerbation of pain.  Pt is aware of limitations of [facility] in regards to imaging and testing.   Current Medications[1]   Past Medical History[2]   Social History:  Short Social Hx on File[3]     REVIEW OF SYSTEMS:   GENERAL HEALTH: feels well otherwise. No fever, chills, or malaise.   CARDIOVASCULAR: denies chest pain or palpitations  RESPIRATORY: denies shortness of breath, cough or wheezing  GI:See HPI  : denies urinary complaints  NEURO: denies loss of bowel or bladder control    EXAM:   /86   Pulse 80   Temp 98.5 °F (36.9 °C)   Resp 16   Ht 5' 7.5\" (1.715 m)   Wt 180 lb (81.6 kg)   SpO2 98%   BMI 27.78 kg/m²   GENERAL: well developed, well nourished,in no apparent distress  SKIN: no rashes,no suspicious lesions  HEAD: atraumatic, normocephalic,   EYES: conjunctiva clear, EOM intact  EARS: TM's clear, no bulging, erythema or fluid bilaterally  NOSE: nostrils patent. Nasal mucosa pink and without exudates.   THROAT: oral mucosa pink/moist, moist. Posterior pharynx is not erythematous. No exudates.  NECK: supple,no adenopathy  LUNGS: clear to auscultation bilaterally. No wheezing, rales, or rhonchi.    CARDIO: RRR without murmur  GI: No visible scars or masses. BS's present x4.  No  palpable masses or hepatosplenomegaly.  Mild tenderness upon palpation just inferior to the inguinal crease. Remainder of the abd without tenderness. There is a tiny palpable area of tissue that is non-mobile and rubbery that correlates to the area of tenderness and that is tender to palpation.  No guarding. no clinton's sign. no Rovsings. no rebound tenderness.  EXTREMITIES: no cyanosis, clubbing or edema    ASSESSMENT AND PLAN:   Nilesh Alvarado is a 34 year old male who presents with:     ASSESSMENT:  Encounter Diagnosis   Name Primary?    Right inguinal pain Yes       PLAN: Comfort Care as listed in Patient Instructions.      Meds & Refills for this Visit:  Requested Prescriptions      No prescriptions requested or ordered in this encounter       The patient indicates understanding of these issues and agrees to the plan.  The patient is asked to see PCP if sx's persist for more than 2 weeks, sooner if worsen.    Patient Instructions   Otc pain relief as needed.   Warm/cool compresses for comfort.   Monitor symptoms carefully.  If worse with pressure/exertion or if symptoms persist without improvement follow-up with PCP.  If pain becomes severe, go to the ED for urgent evaluation.          [1]   No current outpatient medications on file.   [2] No past medical history on file.  [3]   Social History  Socioeconomic History    Marital status: Single   Tobacco Use    Smoking status: Former     Current packs/day: 0.50     Average packs/day: 0.5 packs/day for 5.2 years (2.6 ttl pk-yrs)     Types: Cigarettes     Start date: 3/1/2020    Smokeless tobacco: Never    Tobacco comments:     FORMER   Vaping Use    Vaping status: Former   Substance and Sexual Activity    Alcohol use: Yes     Comment: 1-3 drinks daily    Drug use: Not Currently   Other Topics Concern    Caffeine Concern Yes    Exercise Yes     Social Drivers of Health      Received from [facility]    Housing Stability

## 2025-04-30 NOTE — PATIENT INSTRUCTIONS
Otc pain relief as needed.   Warm/cool compresses for comfort.   Monitor symptoms carefully.  If worse with pressure/exertion or if symptoms persist without improvement follow-up with PCP.  If pain becomes severe, go to the ED for urgent evaluation.

## (undated) NOTE — MR AVS SNAPSHOT
[address]  [address] [address] [phone]               Thank you for choosing us for your health care visit with Zelalem Garvey DO.   We are glad to serve you and happy to provide you with this Reason for Today's Visit     Pain           Medical Issues Discussed Today     Screening for hypertension    -  Primary    Pain        Need for hepatitis C screening test        Groin strain, right, initial encounter        Tobacco abuse counseling dairy products with reduced content of saturated and total fat.    Dietary sodium reduction Reduce dietary sodium intake to <= 100 mmol per day (2.4 g sodium or 6 g sodium chloride)   Aerobic physical activity Regular aerobic physical activity (e.g., brisk Visit [facility] online at  [email or web address]

## (undated) NOTE — LETTER
03/10/21        Roger Alvarado  [facility] [address]      Dear Roger Rosario,    [address] [facility] records indicate that you have outstanding lab work and or testing that was ordered for you and has not yet been completed:  Orders Placed This Encounter      CBC